# Patient Record
Sex: MALE | Race: WHITE | NOT HISPANIC OR LATINO
[De-identification: names, ages, dates, MRNs, and addresses within clinical notes are randomized per-mention and may not be internally consistent; named-entity substitution may affect disease eponyms.]

---

## 2019-11-20 ENCOUNTER — NON-APPOINTMENT (OUTPATIENT)
Age: 79
End: 2019-11-20

## 2019-11-20 ENCOUNTER — APPOINTMENT (OUTPATIENT)
Dept: OPHTHALMOLOGY | Facility: CLINIC | Age: 79
End: 2019-11-20
Payer: MEDICARE

## 2019-11-20 PROCEDURE — 92014 COMPRE OPH EXAM EST PT 1/>: CPT

## 2019-11-20 PROCEDURE — 92004 COMPRE OPH EXAM NEW PT 1/>: CPT

## 2020-07-27 ENCOUNTER — TRANSCRIPTION ENCOUNTER (OUTPATIENT)
Age: 80
End: 2020-07-27

## 2020-07-27 ENCOUNTER — APPOINTMENT (OUTPATIENT)
Dept: ORTHOPEDIC SURGERY | Facility: CLINIC | Age: 80
End: 2020-07-27
Payer: MEDICARE

## 2020-07-27 VITALS — RESPIRATION RATE: 16 BRPM | WEIGHT: 191 LBS | HEIGHT: 71 IN | BODY MASS INDEX: 26.74 KG/M2

## 2020-07-27 DIAGNOSIS — I10 ESSENTIAL (PRIMARY) HYPERTENSION: ICD-10-CM

## 2020-07-27 DIAGNOSIS — Z87.891 PERSONAL HISTORY OF NICOTINE DEPENDENCE: ICD-10-CM

## 2020-07-27 DIAGNOSIS — Z72.89 OTHER PROBLEMS RELATED TO LIFESTYLE: ICD-10-CM

## 2020-07-27 DIAGNOSIS — G47.30 SLEEP APNEA, UNSPECIFIED: ICD-10-CM

## 2020-07-27 DIAGNOSIS — Z85.51 PERSONAL HISTORY OF MALIGNANT NEOPLASM OF BLADDER: ICD-10-CM

## 2020-07-27 DIAGNOSIS — R22.32 LOCALIZED SWELLING, MASS AND LUMP, LEFT UPPER LIMB: ICD-10-CM

## 2020-07-27 PROBLEM — Z00.00 ENCOUNTER FOR PREVENTIVE HEALTH EXAMINATION: Status: ACTIVE | Noted: 2020-07-27

## 2020-07-27 PROCEDURE — 73140 X-RAY EXAM OF FINGER(S): CPT | Mod: F3

## 2020-07-27 PROCEDURE — 99203 OFFICE O/P NEW LOW 30 MIN: CPT

## 2020-07-27 PROCEDURE — 76882 US LMTD JT/FCL EVL NVASC XTR: CPT | Mod: LT

## 2020-07-27 RX ORDER — FAMOTIDINE 40 MG/1
40 TABLET, FILM COATED ORAL
Qty: 90 | Refills: 0 | Status: ACTIVE | COMMUNITY
Start: 2020-05-17

## 2020-07-27 RX ORDER — DEXLANSOPRAZOLE 60 MG/1
60 CAPSULE, DELAYED RELEASE ORAL
Qty: 90 | Refills: 0 | Status: ACTIVE | COMMUNITY
Start: 2020-06-30

## 2020-07-27 RX ORDER — BACLOFEN 10 MG/1
10 TABLET ORAL
Qty: 40 | Refills: 0 | Status: ACTIVE | COMMUNITY
Start: 2020-05-12

## 2020-07-27 RX ORDER — NISOLDIPINE 17 MG/1
17 TABLET, FILM COATED, EXTENDED RELEASE ORAL
Qty: 90 | Refills: 0 | Status: ACTIVE | COMMUNITY
Start: 2020-05-17

## 2020-07-27 RX ORDER — MIRABEGRON 50 MG/1
50 TABLET, FILM COATED, EXTENDED RELEASE ORAL
Qty: 90 | Refills: 0 | Status: ACTIVE | COMMUNITY
Start: 2020-06-30

## 2020-07-27 RX ORDER — SIMVASTATIN 20 MG/1
20 TABLET, FILM COATED ORAL
Qty: 90 | Refills: 0 | Status: ACTIVE | COMMUNITY
Start: 2020-06-01

## 2020-09-14 ENCOUNTER — LABORATORY RESULT (OUTPATIENT)
Age: 80
End: 2020-09-14

## 2020-09-15 ENCOUNTER — TRANSCRIPTION ENCOUNTER (OUTPATIENT)
Age: 80
End: 2020-09-15

## 2020-09-16 ENCOUNTER — RESULT REVIEW (OUTPATIENT)
Age: 80
End: 2020-09-16

## 2020-09-16 ENCOUNTER — APPOINTMENT (OUTPATIENT)
Dept: ORTHOPEDIC SURGERY | Facility: AMBULATORY SURGERY CENTER | Age: 80
End: 2020-09-16
Payer: MEDICARE

## 2020-09-16 ENCOUNTER — OUTPATIENT (OUTPATIENT)
Dept: OUTPATIENT SERVICES | Facility: HOSPITAL | Age: 80
LOS: 1 days | Discharge: HOME | End: 2020-09-16
Payer: MEDICARE

## 2020-09-16 PROCEDURE — 26160 REMOVE TENDON SHEATH LESION: CPT | Mod: F3

## 2020-09-16 PROCEDURE — 88309 TISSUE EXAM BY PATHOLOGIST: CPT | Mod: 26

## 2020-09-18 LAB — SURGICAL PATHOLOGY STUDY: SIGNIFICANT CHANGE UP

## 2020-09-25 ENCOUNTER — APPOINTMENT (OUTPATIENT)
Dept: ORTHOPEDIC SURGERY | Facility: CLINIC | Age: 80
End: 2020-09-25
Payer: MEDICARE

## 2020-09-25 PROCEDURE — 99024 POSTOP FOLLOW-UP VISIT: CPT

## 2020-09-25 RX ORDER — ACETAMINOPHEN AND CODEINE 300; 30 MG/1; MG/1
300-30 TABLET ORAL
Qty: 20 | Refills: 0 | Status: DISCONTINUED | COMMUNITY
Start: 2020-09-15 | End: 2020-09-25

## 2020-10-30 ENCOUNTER — APPOINTMENT (OUTPATIENT)
Dept: ORTHOPEDIC SURGERY | Facility: CLINIC | Age: 80
End: 2020-10-30
Payer: MEDICARE

## 2020-10-30 DIAGNOSIS — D48.1 NEOPLASM OF UNCERTAIN BEHAVIOR OF CONNECTIVE AND OTHER SOFT TISSUE: ICD-10-CM

## 2020-10-30 PROCEDURE — 99024 POSTOP FOLLOW-UP VISIT: CPT

## 2020-12-04 ENCOUNTER — APPOINTMENT (OUTPATIENT)
Dept: OPHTHALMOLOGY | Facility: CLINIC | Age: 80
End: 2020-12-04
Payer: MEDICARE

## 2020-12-04 ENCOUNTER — NON-APPOINTMENT (OUTPATIENT)
Age: 80
End: 2020-12-04

## 2020-12-04 PROCEDURE — 92014 COMPRE OPH EXAM EST PT 1/>: CPT

## 2021-09-14 ENCOUNTER — RESULT REVIEW (OUTPATIENT)
Age: 81
End: 2021-09-14

## 2021-09-14 ENCOUNTER — APPOINTMENT (OUTPATIENT)
Dept: CT IMAGING | Facility: CLINIC | Age: 81
End: 2021-09-14
Payer: MEDICARE

## 2021-09-14 ENCOUNTER — OUTPATIENT (OUTPATIENT)
Dept: OUTPATIENT SERVICES | Facility: HOSPITAL | Age: 81
LOS: 1 days | End: 2021-09-14

## 2021-09-14 PROCEDURE — 71275 CT ANGIOGRAPHY CHEST: CPT | Mod: 26,MH

## 2021-11-27 ENCOUNTER — NON-APPOINTMENT (OUTPATIENT)
Age: 81
End: 2021-11-27

## 2021-12-02 ENCOUNTER — APPOINTMENT (OUTPATIENT)
Dept: OPHTHALMOLOGY | Facility: CLINIC | Age: 81
End: 2021-12-02
Payer: MEDICARE

## 2021-12-02 ENCOUNTER — NON-APPOINTMENT (OUTPATIENT)
Age: 81
End: 2021-12-02

## 2021-12-02 PROCEDURE — 92012 INTRM OPH EXAM EST PATIENT: CPT

## 2021-12-02 PROCEDURE — 92250 FUNDUS PHOTOGRAPHY W/I&R: CPT

## 2022-04-04 ENCOUNTER — NON-APPOINTMENT (OUTPATIENT)
Age: 82
End: 2022-04-04

## 2022-04-11 ENCOUNTER — RESULT REVIEW (OUTPATIENT)
Age: 82
End: 2022-04-11

## 2022-04-11 ENCOUNTER — APPOINTMENT (OUTPATIENT)
Dept: CT IMAGING | Facility: CLINIC | Age: 82
End: 2022-04-11
Payer: MEDICARE

## 2022-04-11 ENCOUNTER — OUTPATIENT (OUTPATIENT)
Dept: OUTPATIENT SERVICES | Facility: HOSPITAL | Age: 82
LOS: 1 days | End: 2022-04-11

## 2022-04-11 PROCEDURE — 71250 CT THORAX DX C-: CPT | Mod: 26,MH

## 2022-07-02 ENCOUNTER — TRANSCRIPTION ENCOUNTER (OUTPATIENT)
Age: 82
End: 2022-07-02

## 2022-07-03 ENCOUNTER — EMERGENCY (EMERGENCY)
Facility: HOSPITAL | Age: 82
LOS: 1 days | Discharge: HOME | End: 2022-07-03
Attending: EMERGENCY MEDICINE | Admitting: EMERGENCY MEDICINE

## 2022-07-03 VITALS
WEIGHT: 195.11 LBS | OXYGEN SATURATION: 98 % | HEIGHT: 71 IN | SYSTOLIC BLOOD PRESSURE: 156 MMHG | DIASTOLIC BLOOD PRESSURE: 80 MMHG | HEART RATE: 62 BPM | RESPIRATION RATE: 16 BRPM | TEMPERATURE: 98 F

## 2022-07-03 DIAGNOSIS — S62.307A UNSPECIFIED FRACTURE OF FIFTH METACARPAL BONE, LEFT HAND, INITIAL ENCOUNTER FOR CLOSED FRACTURE: ICD-10-CM
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
## 2022-07-03 DIAGNOSIS — I10 ESSENTIAL (PRIMARY) HYPERTENSION: ICD-10-CM
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
## 2022-07-03 DIAGNOSIS — E78.5 HYPERLIPIDEMIA, UNSPECIFIED: ICD-10-CM

## 2022-07-03 DIAGNOSIS — M79.642 PAIN IN LEFT HAND: ICD-10-CM

## 2022-07-03 PROCEDURE — 99283 EMERGENCY DEPT VISIT LOW MDM: CPT

## 2022-07-03 PROCEDURE — 73130 X-RAY EXAM OF HAND: CPT | Mod: 26,LT,76

## 2022-07-03 NOTE — ED PROVIDER NOTE - OBJECTIVE STATEMENT
83 yo male pt, hx of HTN, HLD, presents for L hand pain after a recent fall. Now w pain in medical aspect of L hand, swelling. no head trauma, no LOC, no chest pain, no abd pain.

## 2022-07-03 NOTE — ED PROVIDER NOTE - CARE PROVIDER_API CALL
Quintin Connor)  Plastic Surgery  [address], [address]  [place] [address]  Phone: [phone]  Fax: [phone]  Follow Up Time:

## 2022-07-03 NOTE — ED ADULT NURSE NOTE - OBJECTIVE STATEMENT
Pt aox3 with steady gait on arrival. trip fall on thursday evening onto right knee and left wrist. Pt denies hs. left wrist swelling today with painful movement. Pt able to move fingers and wrist. no deformity.

## 2022-07-03 NOTE — ED PROVIDER NOTE - PATIENT PORTAL LINK FT
You can access the FollowMyHealth Patient Portal offered by [facility] by registering at the following website: [email or web address]. By joining [employer]’s FollowMyHealth portal, you will also be able to view your health information using other applications (apps) compatible with our system.

## 2022-07-03 NOTE — ED PROVIDER NOTE - MUSCULOSKELETAL, MLM
tenderness through 5th metacarpal on L hand, able to make a fist, FROM in L 5th digit, L wrist from, no tenderness, moderate swelling, normal neurovasc exam

## 2022-07-09 ENCOUNTER — APPOINTMENT (OUTPATIENT)
Dept: RADIOLOGY | Facility: CLINIC | Age: 82
End: 2022-07-09

## 2022-07-09 ENCOUNTER — OUTPATIENT (OUTPATIENT)
Dept: OUTPATIENT SERVICES | Facility: HOSPITAL | Age: 82
LOS: 1 days | End: 2022-07-09

## 2022-07-09 PROCEDURE — 73130 X-RAY EXAM OF HAND: CPT | Mod: 26,LT

## 2022-07-26 ENCOUNTER — OUTPATIENT (OUTPATIENT)
Dept: OUTPATIENT SERVICES | Facility: HOSPITAL | Age: 82
LOS: 1 days | End: 2022-07-26

## 2022-07-26 ENCOUNTER — APPOINTMENT (OUTPATIENT)
Dept: RADIOLOGY | Facility: CLINIC | Age: 82
End: 2022-07-26

## 2022-07-26 PROCEDURE — 73140 X-RAY EXAM OF FINGER(S): CPT | Mod: 26,LT

## 2022-09-30 ENCOUNTER — NON-APPOINTMENT (OUTPATIENT)
Age: 82
End: 2022-09-30

## 2022-10-12 ENCOUNTER — OUTPATIENT (OUTPATIENT)
Dept: OUTPATIENT SERVICES | Facility: HOSPITAL | Age: 82
LOS: 1 days | End: 2022-10-12

## 2022-10-12 ENCOUNTER — APPOINTMENT (OUTPATIENT)
Dept: MRI IMAGING | Facility: CLINIC | Age: 82
End: 2022-10-12

## 2022-10-12 ENCOUNTER — APPOINTMENT (OUTPATIENT)
Dept: ULTRASOUND IMAGING | Facility: CLINIC | Age: 82
End: 2022-10-12

## 2022-10-12 ENCOUNTER — RESULT REVIEW (OUTPATIENT)
Age: 82
End: 2022-10-12

## 2022-10-12 PROCEDURE — 71555 MRI ANGIO CHEST W OR W/O DYE: CPT | Mod: 26,MH

## 2022-10-12 PROCEDURE — 93975 VASCULAR STUDY: CPT | Mod: 26

## 2022-10-12 PROCEDURE — 76870 US EXAM SCROTUM: CPT | Mod: 26

## 2022-12-09 ENCOUNTER — APPOINTMENT (OUTPATIENT)
Dept: OPHTHALMOLOGY | Facility: CLINIC | Age: 82
End: 2022-12-09

## 2022-12-09 ENCOUNTER — NON-APPOINTMENT (OUTPATIENT)
Age: 82
End: 2022-12-09

## 2022-12-09 PROCEDURE — 92014 COMPRE OPH EXAM EST PT 1/>: CPT

## 2022-12-09 PROCEDURE — 92250 FUNDUS PHOTOGRAPHY W/I&R: CPT

## 2023-12-06 ENCOUNTER — APPOINTMENT (OUTPATIENT)
Dept: OPHTHALMOLOGY | Facility: CLINIC | Age: 83
End: 2023-12-06
Payer: MEDICARE

## 2023-12-06 ENCOUNTER — NON-APPOINTMENT (OUTPATIENT)
Age: 83
End: 2023-12-06

## 2023-12-06 PROCEDURE — 92012 INTRM OPH EXAM EST PATIENT: CPT

## 2023-12-06 PROCEDURE — 92250 FUNDUS PHOTOGRAPHY W/I&R: CPT

## 2024-09-21 NOTE — ED ADULT NURSE NOTE - NSIMPLEMENTINTERV_GEN_ALL_ED
Implemented All Fall Risk Interventions:  [place] to call system. Call bell, personal items and telephone within reach. Instruct patient to call for assistance. Room bathroom lighting operational. Non-slip footwear when patient is off stretcher. Physically safe environment: no spills, clutter or unnecessary equipment. Stretcher in lowest position, wheels locked, appropriate side rails in place. Provide visual cue, wrist band, yellow gown, etc. Monitor gait and stability. Monitor for mental status changes and reorient to person, place, and time. Review medications for side effects contributing to fall risk. Reinforce activity limits and safety measures with patient and family.
None

## 2024-10-01 ENCOUNTER — APPOINTMENT (OUTPATIENT)
Dept: PULMONOLOGY | Facility: CLINIC | Age: 84
End: 2024-10-01
Payer: MEDICARE

## 2024-10-01 VITALS
HEART RATE: 65 BPM | DIASTOLIC BLOOD PRESSURE: 75 MMHG | OXYGEN SATURATION: 96 % | TEMPERATURE: 99 F | SYSTOLIC BLOOD PRESSURE: 125 MMHG | HEIGHT: 71 IN | BODY MASS INDEX: 28.14 KG/M2 | WEIGHT: 201 LBS

## 2024-10-01 DIAGNOSIS — G47.33 OBSTRUCTIVE SLEEP APNEA (ADULT) (PEDIATRIC): ICD-10-CM

## 2024-10-01 PROCEDURE — 99204 OFFICE O/P NEW MOD 45 MIN: CPT

## 2024-10-01 PROCEDURE — G2211 COMPLEX E/M VISIT ADD ON: CPT

## 2024-10-01 RX ORDER — OLMESARTAN MEDOXOMIL 40 MG/1
40 TABLET, FILM COATED ORAL
Refills: 0 | Status: ACTIVE | COMMUNITY

## 2024-10-01 RX ORDER — ROSUVASTATIN CALCIUM 10 MG/1
10 TABLET, FILM COATED ORAL
Refills: 0 | Status: ACTIVE | COMMUNITY

## 2024-10-01 RX ORDER — HYDROXYZINE PAMOATE 25 MG/1
25 CAPSULE ORAL
Refills: 0 | Status: ACTIVE | COMMUNITY

## 2024-10-01 RX ORDER — AMLODIPINE BESYLATE 5 MG/1
5 TABLET ORAL
Refills: 0 | Status: ACTIVE | COMMUNITY

## 2024-10-01 NOTE — PHYSICAL EXAM
[General Appearance - Well Developed] : well developed [General Appearance - Well Nourished] : well nourished [Normal Conjunctiva] : the conjunctiva exhibited no abnormalities [Normal Oropharynx] : normal oropharynx [Neck Appearance] : the appearance of the neck was normal [Apical Impulse] : the apical impulse was normal [Heart Rate And Rhythm] : heart rate was normal and rhythm regular [] : no respiratory distress [Respiration, Rhythm And Depth] : normal respiratory rhythm and effort [Abnormal Walk] : normal gait [Nail Clubbing] : no clubbing of the fingernails [Cyanosis, Localized] : no localized cyanosis [Oriented To Time, Place, And Person] : oriented to person, place, and time

## 2024-10-02 NOTE — ASSESSMENT
[FreeTextEntry1] : REVIEWED Compliance report from 7/3-9/30/24 demonstrates 96.7% usage (67.7% >/= 4 hours), average nightly usage [id number], residual AHI 0.5/hr, 95th percentile mask leak 13.3/hr.  Mr. Valles 85 yo man who presents for the evaluation of moderate PEDRO LUIS on CPAP. His sleep physician Dr. Baez ([facility]) retired, and he is looking to transition care. He was diagnosed with moderate PEDRO LUIS (AHI 18.9/hr) in 2015 and has been compliant with CPAP usage. He is looking to renew his supplies today. He is receiving benefit from PAP therapy and should continue to use it.  Plan: - Continue CPAP; asked [employer] to link our accounts -Has a refurbished machine from [employer]; offered new equipment but he is not interested in any other manufacturers; would need to repeat testing to qualify for new equipment  - I have discussed all the negative health consequences associated with obstructive and central sleep apnea including heart conditions/MI, hypertension, diabetes, chronic inflammation, memory issues, stroke, obesity, decreased libido, sleep related accidents, as well as anxiety and depression.  - Additional recommendations included: Avoid alcohol and sedatives at bedtime. Sleepy driving avoidance and risks discussed with patient.  - Diet, exercise and weight loss suggested. - Additionally, I have discussed the need for compliance to using the machine nightly for adequate therapy. The patient verbalized understanding and knows the next course of action. -Follow up in 3 months

## 2024-10-02 NOTE — HISTORY OF PRESENT ILLNESS
[Obstructive Sleep Apnea] : obstructive sleep apnea [FreeTextEntry1] : Mr. Valles 83 yo man who presents for the evaluation of moderate PEDRO LUIS on CPAP. Other PMHx includes HTN. HLD, GERD, overactive bladder.   His sleep physician Dr. Baez ([facility]) retired, and he is looking to transition care. He was diagnosed with moderate PEDRO LUIS (AHI 18.9/hr) in 2015 and has been compliant with CPAP usage. He is looking to renew his supplies today. Compliance report from 7/3-9/30/24 demonstrates 96.7% usage (67.7% >/= 4 hours), average nightly usage [id number], residual AHI 0.5/hr, 95th percentile mask leak 13.3/hr.  Since starting CPAP he has experienced an improvement in his quality of life. He is not as exhausted, feels more energized in the morning, and has not had migraines.  He rarely naps during the day (ESS10).  He also has a mandibular advancement device which he uses during travel. He uses it sparingly because he feels the CPAP controls his sleep apnea better. Weight has increased since 2020 by about 10 lbs.   EPWORTH SLEEPINESS SCALE  How likely are you to doze off or fall asleep in the situations described below, in contrast to feeling just tired? This refers to your usual way of life in recent times. Even if you haven't done some of these things recently, try to work out how they would have affected you. Use the following scale to choose one most appropriate number for each situation.   Chance of dozing.............Situation 2........................................Sitting and reading 1........................................Watching TV 1........................................Sitting inactive in a public place (eg a theatre or a meeting) 1........................................As a passenger in a car for an hour without a break 3........................................Lying down to rest in the afternoon when circumstances permit 0........................................Sitting and talking to someone 2........................................Sitting quietly after lunch without alcohol 0........................................In a car, while stopped for a few minutes in traffic  10........................................TOTAL SCORE   Sleep studies: 5/18/15 In lab PSG- AHI 18.9, noe sat 91%, T90 0% 6/17/15 CPAP titration- optimal pressure was determined to be 10 cm2O which normalized AHI.    [employer]  Cathie Dreamstation Auto CPAP Setup date: 11/12/20  Settings: 10 cmH2O  Mask: nasal cushions DME: [employer] [Snoring] : no snoring [Witnessed Apneas] : no witnessed sleep apnea [Frequent Nocturnal Awakening] : no nocturnal awakening [Unintentional Sleep while Active] : no unintentional sleep while active [Unintentional Sleep While Inactive] : no unintentional sleep while inactive [Awakes Unrefreshed] : does not awake unrefreshed [Awakes with Headache] : no headache upon awakening [Awakening With Dry Mouth] : no dry mouth upon awakening [Recent  Weight Gain] : no recent weight gain [Daytime Somnolence] : no daytime somnolence [DIS] : no DIS [DMS] : no DMS [Unusual Sleep Behavior] : no unusual sleep behavior [Hypersomnolence] : no hypersomnolence [Cataplexy] : no cataplexy [Sleep Paralysis] : no sleep paralysis [Hypnagogic Hallucinations] : no hallucinations when falling asleep [Hypnopompic Hallucinations] : no hallucinations when awakening [Lower Extremity Discomfort] : no lower extremity discomfort in evening or at bedtime [ESS] : 10

## 2024-10-02 NOTE — CONSULT LETTER
[Dear  ___] : Dear  [unfilled], [Consult Letter:] : I had the pleasure of evaluating your patient, [unfilled]. [Please see my note below.] : Please see my note below. [Consult Closing:] : Thank you very much for allowing me to participate in the care of this patient.  If you have any questions, please do not hesitate to contact me. [Sincerely,] : Sincerely, [FreeTextEntry3] : Snehal Kevin MD  [employer] at [facility] Pulmonary, Critical Care, and Sleep Medicine

## 2024-10-02 NOTE — HISTORY OF PRESENT ILLNESS
[Obstructive Sleep Apnea] : obstructive sleep apnea [FreeTextEntry1] : Mr. Valles 83 yo man who presents for the evaluation of moderate PEDRO LUIS on CPAP. Other PMHx includes HTN. HLD, GERD, overactive bladder.   His sleep physician Dr. Baez ([facility]) retired, and he is looking to transition care. He was diagnosed with moderate PEDRO LUIS (AHI 18.9/hr) in 2015 and has been compliant with CPAP usage. He is looking to renew his supplies today. Compliance report from 7/3-9/30/24 demonstrates 96.7% usage (67.7% >/= 4 hours), average nightly usage [id number], residual AHI 0.5/hr, 95th percentile mask leak 13.3/hr.  Since starting CPAP he has experienced an improvement in his quality of life. He is not as exhausted, feels more energized in the morning, and has not had migraines.  He rarely naps during the day (ESS10).  He also has a mandibular advancement device which he uses during travel. He uses it sparingly because he feels the CPAP controls his sleep apnea better. Weight has increased since 2020 by about 10 lbs.   EPWORTH SLEEPINESS SCALE  How likely are you to doze off or fall asleep in the situations described below, in contrast to feeling just tired? This refers to your usual way of life in recent times. Even if you haven't done some of these things recently, try to work out how they would have affected you. Use the following scale to choose one most appropriate number for each situation.   Chance of dozing.............Situation 2........................................Sitting and reading 1........................................Watching TV 1........................................Sitting inactive in a public place (eg a theatre or a meeting) 1........................................As a passenger in a car for an hour without a break 3........................................Lying down to rest in the afternoon when circumstances permit 0........................................Sitting and talking to someone 2........................................Sitting quietly after lunch without alcohol 0........................................In a car, while stopped for a few minutes in traffic  10........................................TOTAL SCORE   Sleep studies: 5/18/15 In lab PSG- AHI 18.9, noe sat 91%, T90 0% 6/17/15 CPAP titration- optimal pressure was determined to be 10 cm2O which normalized AHI.    [employer]  Cathie Dreamstation Auto CPAP Setup date: 11/12/20  Settings: 10 cmH2O  Mask: nasal cushions DME: [employer] [Snoring] : no snoring [Witnessed Apneas] : no witnessed sleep apnea [Frequent Nocturnal Awakening] : no nocturnal awakening [Unintentional Sleep while Active] : no unintentional sleep while active [Unintentional Sleep While Inactive] : no unintentional sleep while inactive [Awakes Unrefreshed] : does not awake unrefreshed [Awakes with Headache] : no headache upon awakening [Awakening With Dry Mouth] : no dry mouth upon awakening [Recent  Weight Gain] : no recent weight gain [Daytime Somnolence] : no daytime somnolence [DIS] : no DIS [DMS] : no DMS [Unusual Sleep Behavior] : no unusual sleep behavior [Hypersomnolence] : no hypersomnolence [Cataplexy] : no cataplexy [Sleep Paralysis] : no sleep paralysis [Hypnagogic Hallucinations] : no hallucinations when falling asleep [Hypnopompic Hallucinations] : no hallucinations when awakening [Lower Extremity Discomfort] : no lower extremity discomfort in evening or at bedtime [ESS] : 10

## 2024-10-02 NOTE — ASSESSMENT
[FreeTextEntry1] : REVIEWED Compliance report from 7/3-9/30/24 demonstrates 96.7% usage (67.7% >/= 4 hours), average nightly usage [id number], residual AHI 0.5/hr, 95th percentile mask leak 13.3/hr.  Mr. Valles 85 yo man who presents for the evaluation of moderate PEDRO LUIS on CPAP. His sleep physician Dr. Baez ([facility]) retired, and he is looking to transition care. He was diagnosed with moderate PEDRO LUIS (AHI 18.9/hr) in 2015 and has been compliant with CPAP usage. He is looking to renew his supplies today. He is receiving benefit from PAP therapy and should continue to use it.  Plan: - Continue CPAP; asked [employer] to link our accounts -Has a refurbished machine from [employer]; offered new equipment but he is not interested in any other manufacturers; would need to repeat testing to qualify for new equipment  - I have discussed all the negative health consequences associated with obstructive and central sleep apnea including heart conditions/MI, hypertension, diabetes, chronic inflammation, memory issues, stroke, obesity, decreased libido, sleep related accidents, as well as anxiety and depression.  - Additional recommendations included: Avoid alcohol and sedatives at bedtime. Sleepy driving avoidance and risks discussed with patient.  - Diet, exercise and weight loss suggested. - Additionally, I have discussed the need for compliance to using the machine nightly for adequate therapy. The patient verbalized understanding and knows the next course of action. -Follow up in 3 months

## 2024-10-02 NOTE — CONSULT LETTER
[Dear  ___] : Dear  [unfilled], [Consult Letter:] : I had the pleasure of evaluating your patient, [unfilled]. [Please see my note below.] : Please see my note below. [Consult Closing:] : Thank you very much for allowing me to participate in the care of this patient.  If you have any questions, please do not hesitate to contact me. [Sincerely,] : Sincerely, [FreeTextEntry3] : Snehal Keivn MD  [employer] at [facility] Pulmonary, Critical Care, and Sleep Medicine

## 2024-10-08 ENCOUNTER — NON-APPOINTMENT (OUTPATIENT)
Age: 84
End: 2024-10-08

## 2024-12-05 ENCOUNTER — NON-APPOINTMENT (OUTPATIENT)
Age: 84
End: 2024-12-05

## 2024-12-05 ENCOUNTER — APPOINTMENT (OUTPATIENT)
Dept: OPHTHALMOLOGY | Facility: CLINIC | Age: 84
End: 2024-12-05
Payer: MEDICARE

## 2024-12-05 PROCEDURE — 92250 FUNDUS PHOTOGRAPHY W/I&R: CPT

## 2024-12-05 PROCEDURE — 92012 INTRM OPH EXAM EST PATIENT: CPT

## 2024-12-25 PROBLEM — F10.90 ALCOHOL USE: Status: ACTIVE | Noted: 2020-07-27

## 2024-12-26 NOTE — ED ADULT TRIAGE NOTE - AS HEIGHT TYPE
Gabapentin is being filled in [place] so not seen on our PDMP.  The last time I see that it was refilled by Dr. Coughlin was in June of this year at the requested dose.  
stated

## 2025-04-03 ENCOUNTER — APPOINTMENT (OUTPATIENT)
Dept: CT IMAGING | Facility: CLINIC | Age: 85
End: 2025-04-03
Payer: MEDICARE

## 2025-04-03 ENCOUNTER — OUTPATIENT (OUTPATIENT)
Dept: OUTPATIENT SERVICES | Facility: HOSPITAL | Age: 85
LOS: 1 days | End: 2025-04-03

## 2025-04-03 PROCEDURE — 71250 CT THORAX DX C-: CPT | Mod: 26

## 2025-04-21 ENCOUNTER — APPOINTMENT (OUTPATIENT)
Dept: OTOLARYNGOLOGY | Facility: CLINIC | Age: 85
End: 2025-04-21
Payer: MEDICARE

## 2025-04-21 VITALS — HEIGHT: 71 IN | WEIGHT: 200 LBS | BODY MASS INDEX: 28 KG/M2

## 2025-04-21 DIAGNOSIS — R49.0 DYSPHONIA: ICD-10-CM

## 2025-04-21 DIAGNOSIS — Z87.39 PERSONAL HISTORY OF OTHER DISEASES OF THE MUSCULOSKELETAL SYSTEM AND CONNECTIVE TISSUE: ICD-10-CM

## 2025-04-21 DIAGNOSIS — Z86.39 PERSONAL HISTORY OF OTHER ENDOCRINE, NUTRITIONAL AND METABOLIC DISEASE: ICD-10-CM

## 2025-04-21 DIAGNOSIS — H90.3 SENSORINEURAL HEARING LOSS, BILATERAL: ICD-10-CM

## 2025-04-21 DIAGNOSIS — Z86.79 PERSONAL HISTORY OF OTHER DISEASES OF THE CIRCULATORY SYSTEM: ICD-10-CM

## 2025-04-21 DIAGNOSIS — Z78.9 OTHER SPECIFIED HEALTH STATUS: ICD-10-CM

## 2025-04-21 DIAGNOSIS — Z87.11 PERSONAL HISTORY OF PEPTIC ULCER DISEASE: ICD-10-CM

## 2025-04-21 DIAGNOSIS — K21.9 GASTRO-ESOPHAGEAL REFLUX DISEASE W/OUT ESOPHAGITIS: ICD-10-CM

## 2025-04-21 PROCEDURE — 92557 COMPREHENSIVE HEARING TEST: CPT

## 2025-04-21 PROCEDURE — 92567 TYMPANOMETRY: CPT

## 2025-04-21 PROCEDURE — 99203 OFFICE O/P NEW LOW 30 MIN: CPT | Mod: 25

## 2025-04-21 PROCEDURE — 31575 DIAGNOSTIC LARYNGOSCOPY: CPT

## 2025-04-21 RX ORDER — HYDROCHLOROTHIAZIDE 12.5 MG/1
TABLET ORAL
Refills: 0 | Status: ACTIVE | COMMUNITY

## 2025-06-13 ENCOUNTER — APPOINTMENT (OUTPATIENT)
Dept: OTOLARYNGOLOGY | Facility: CLINIC | Age: 85
End: 2025-06-13
Payer: MEDICARE

## 2025-06-13 PROCEDURE — 92524 BEHAVRAL QUALIT ANALYS VOICE: CPT | Mod: GN

## 2025-06-13 PROCEDURE — 92520 LARYNGEAL FUNCTION STUDIES: CPT | Mod: GN,59

## 2025-06-13 PROCEDURE — 31579 LARYNGOSCOPY TELESCOPIC: CPT

## 2025-07-03 ENCOUNTER — APPOINTMENT (OUTPATIENT)
Dept: OTOLARYNGOLOGY | Facility: CLINIC | Age: 85
End: 2025-07-03

## 2025-07-03 PROCEDURE — 92507 TX SP LANG VOICE COMM INDIV: CPT | Mod: GN

## 2025-07-17 ENCOUNTER — APPOINTMENT (OUTPATIENT)
Dept: OTOLARYNGOLOGY | Facility: CLINIC | Age: 85
End: 2025-07-17
Payer: MEDICARE

## 2025-07-17 PROCEDURE — 92507 TX SP LANG VOICE COMM INDIV: CPT | Mod: GN

## 2025-08-08 ENCOUNTER — APPOINTMENT (OUTPATIENT)
Dept: OTOLARYNGOLOGY | Facility: CLINIC | Age: 85
End: 2025-08-08
Payer: MEDICARE

## 2025-08-08 PROCEDURE — 92507 TX SP LANG VOICE COMM INDIV: CPT | Mod: GN
